# Patient Record
Sex: FEMALE | Race: WHITE
[De-identification: names, ages, dates, MRNs, and addresses within clinical notes are randomized per-mention and may not be internally consistent; named-entity substitution may affect disease eponyms.]

---

## 2019-03-07 ENCOUNTER — HOSPITAL ENCOUNTER (INPATIENT)
Dept: HOSPITAL 92 - ERS | Age: 84
LOS: 2 days | Discharge: HOME | DRG: 301 | End: 2019-03-09
Attending: FAMILY MEDICINE | Admitting: FAMILY MEDICINE
Payer: MEDICARE

## 2019-03-07 VITALS — BODY MASS INDEX: 25.9 KG/M2

## 2019-03-07 DIAGNOSIS — I82.412: Primary | ICD-10-CM

## 2019-03-07 DIAGNOSIS — M81.0: ICD-10-CM

## 2019-03-07 DIAGNOSIS — F41.9: ICD-10-CM

## 2019-03-07 DIAGNOSIS — G30.0: ICD-10-CM

## 2019-03-07 DIAGNOSIS — Z90.710: ICD-10-CM

## 2019-03-07 DIAGNOSIS — E78.5: ICD-10-CM

## 2019-03-07 DIAGNOSIS — Z98.890: ICD-10-CM

## 2019-03-07 DIAGNOSIS — I12.9: ICD-10-CM

## 2019-03-07 DIAGNOSIS — Z91.81: ICD-10-CM

## 2019-03-07 DIAGNOSIS — I69.320: ICD-10-CM

## 2019-03-07 DIAGNOSIS — E03.9: ICD-10-CM

## 2019-03-07 DIAGNOSIS — Z96.651: ICD-10-CM

## 2019-03-07 DIAGNOSIS — H54.7: ICD-10-CM

## 2019-03-07 DIAGNOSIS — N18.3: ICD-10-CM

## 2019-03-07 DIAGNOSIS — F32.9: ICD-10-CM

## 2019-03-07 DIAGNOSIS — I25.10: ICD-10-CM

## 2019-03-07 DIAGNOSIS — K21.9: ICD-10-CM

## 2019-03-07 DIAGNOSIS — F02.80: ICD-10-CM

## 2019-03-07 DIAGNOSIS — Z95.5: ICD-10-CM

## 2019-03-07 LAB
ALBUMIN SERPL BCG-MCNC: 3.8 G/DL (ref 3.4–4.8)
ALP SERPL-CCNC: 86 U/L (ref 40–150)
ALT SERPL W P-5'-P-CCNC: 24 U/L (ref 8–55)
ANION GAP SERPL CALC-SCNC: 13 MMOL/L (ref 10–20)
APTT PPP: 26.4 SEC (ref 22.9–36.1)
AST SERPL-CCNC: 23 U/L (ref 5–34)
BASOPHILS # BLD AUTO: 0 THOU/UL (ref 0–0.2)
BASOPHILS NFR BLD AUTO: 0.5 % (ref 0–1)
BILIRUB SERPL-MCNC: 0.4 MG/DL (ref 0.2–1.2)
BUN SERPL-MCNC: 33 MG/DL (ref 9.8–20.1)
CALCIUM SERPL-MCNC: 9.2 MG/DL (ref 7.8–10.44)
CHLORIDE SERPL-SCNC: 109 MMOL/L (ref 98–107)
CO2 SERPL-SCNC: 23 MMOL/L (ref 23–31)
CREAT CL PREDICTED SERPL C-G-VRATE: 0 ML/MIN (ref 70–130)
EOSINOPHIL # BLD AUTO: 0.1 THOU/UL (ref 0–0.7)
EOSINOPHIL NFR BLD AUTO: 0.7 % (ref 0–10)
GLOBULIN SER CALC-MCNC: 2.8 G/DL (ref 2.4–3.5)
GLUCOSE SERPL-MCNC: 95 MG/DL (ref 83–110)
HGB BLD-MCNC: 14.1 G/DL (ref 12–16)
INR PPP: 1
LYMPHOCYTES # BLD: 1.3 THOU/UL (ref 1.2–3.4)
LYMPHOCYTES NFR BLD AUTO: 12.5 % (ref 21–51)
MCH RBC QN AUTO: 34.7 PG (ref 27–31)
MCV RBC AUTO: 103 FL (ref 78–98)
MONOCYTES # BLD AUTO: 0.7 THOU/UL (ref 0.11–0.59)
MONOCYTES NFR BLD AUTO: 6.9 % (ref 0–10)
NEUTROPHILS # BLD AUTO: 8.4 THOU/UL (ref 1.4–6.5)
NEUTROPHILS NFR BLD AUTO: 79.5 % (ref 42–75)
PLATELET # BLD AUTO: 166 THOU/UL (ref 130–400)
POTASSIUM SERPL-SCNC: 4.6 MMOL/L (ref 3.5–5.1)
PROTHROMBIN TIME: 13.3 SEC (ref 12–14.7)
RBC # BLD AUTO: 4.07 MILL/UL (ref 4.2–5.4)
SODIUM SERPL-SCNC: 140 MMOL/L (ref 136–145)
WBC # BLD AUTO: 10.6 THOU/UL (ref 4.8–10.8)

## 2019-03-07 PROCEDURE — 85610 PROTHROMBIN TIME: CPT

## 2019-03-07 PROCEDURE — 85018 HEMOGLOBIN: CPT

## 2019-03-07 PROCEDURE — 36415 COLL VENOUS BLD VENIPUNCTURE: CPT

## 2019-03-07 PROCEDURE — 93005 ELECTROCARDIOGRAM TRACING: CPT

## 2019-03-07 PROCEDURE — 80053 COMPREHEN METABOLIC PANEL: CPT

## 2019-03-07 PROCEDURE — 85049 AUTOMATED PLATELET COUNT: CPT

## 2019-03-07 PROCEDURE — 85025 COMPLETE CBC W/AUTO DIFF WBC: CPT

## 2019-03-07 PROCEDURE — 96376 TX/PRO/DX INJ SAME DRUG ADON: CPT

## 2019-03-07 PROCEDURE — 85730 THROMBOPLASTIN TIME PARTIAL: CPT

## 2019-03-07 PROCEDURE — 80048 BASIC METABOLIC PNL TOTAL CA: CPT

## 2019-03-07 PROCEDURE — 96374 THER/PROPH/DIAG INJ IV PUSH: CPT

## 2019-03-07 PROCEDURE — 85014 HEMATOCRIT: CPT

## 2019-03-07 PROCEDURE — 82565 ASSAY OF CREATININE: CPT

## 2019-03-07 NOTE — ULT
LEFT LOWER EXTREMITY VENOUS ULTRASOUND WITH DOPPLER

3/7/19

 

HISTORY: 

Swelling. Edema. Evaluate for thrombus.

 

COMPARISON:  

None.

 

TECHNIQUE:  

Gray scale, color flow, doppler imaging with spectral waveform analysis performed in a left lower ext
remity venous system. 

 

FINDINGS:  

there is partial compressibility of the common femoral vein, femoral vein. There is lack of compressi
bility of the popliteal vein. There is diminished flow, compatible with deep vein thrombus.  There is
 also lack of compressibility in the greater saphenous vein. Posterior tibial vein is also not compre
ssible. There is flow in the profunda femoral vein.

 

IMPRESSION:  

Thrombus in the left lower extremity deep venous system. 

 

POS: EMMANUEL

## 2019-03-08 LAB
APTT PPP: (no result) SEC (ref 22.9–36.1)
APTT PPP: 190.7 SEC (ref 22.9–36.1)
CREAT CL PREDICTED SERPL C-G-VRATE: 33 ML/MIN (ref 70–130)
HGB BLD-MCNC: 12 G/DL (ref 12–16)
HGB BLD-MCNC: 13.7 G/DL (ref 12–16)
PLATELET # BLD AUTO: 153 THOU/UL (ref 130–400)
PLATELET # BLD AUTO: 170 THOU/UL (ref 130–400)

## 2019-03-08 RX ADMIN — Medication SCH ML: at 20:33

## 2019-03-08 RX ADMIN — Medication SCH ML: at 12:51

## 2019-03-08 RX ADMIN — ASPIRIN SCH MG: 81 TABLET ORAL at 09:45

## 2019-03-08 NOTE — PDOC.PN
- Subjective


Encounter Start Date: 03/08/19


Encounter Start Time: 09:30





Patient seen and examined, no new issues,  at bedside, all questions 

answered. 





- Objective


Resuscitation Status - Order Detail:





03/08/19 05:35


Resuscitation Status Routine 


   Resuscitation Status: FULL: Full Resuscitation








Vital Signs & Weight: 


 Vital Signs (12 hours)











  Temp Pulse Resp BP Pulse Ox


 


 03/08/19 04:40  97.9 F  79  12  139/69  94 L


 


 03/07/19 23:26  97.5 F L  84  19  143/67 H  94 L








 Weight











Weight                         137 lb 9.095 oz














I&O: 


 











 03/07/19 03/08/19 03/09/19





 06:59 06:59 06:59


 


Intake Total  568 


 


Balance  568 











Result Diagrams: 


 03/08/19 06:02





 03/07/19 18:20





Phys Exam





- Physical Examination


HEENT: PERRLA, moist MMs, sclera anicteric


Neck: no nodes, no JVD, supple


Respiratory: no wheezing, no rales, no rhonchi


Cardiovascular: RRR, no significant murmur, no rub


Gastrointestinal: soft, non-tender, no distention


Musculoskeletal: pulses present, edema present (LLE with brusing)





Dx/Plan


(1) DVT (deep venous thrombosis)


Code(s): I82.409 - ACUTE EMBOLISM AND THOMBOS UNSP DEEP VN UNSP LOWER EXTREMITY

   Status: Acute   





(2) Hypothyroidism


Code(s): E03.9 - HYPOTHYROIDISM, UNSPECIFIED   Status: Acute   





(3) CKD (chronic kidney disease) stage 3, GFR 30-59 ml/min


Code(s): N18.3 - CHRONIC KIDNEY DISEASE, STAGE 3 (MODERATE)   Status: Acute   





(4) Weakness


Code(s): R53.1 - WEAKNESS   Status: Chronic   





- Plan





* cont heparin drip for now


* will monitor for 24-48hrs, significant swelling


* patient's  [place] patient and  

were offered to switch to a PO anticoagulant and requested that they follow up 

with their PCP in 1 week for repeat blood work and monitoring, however, the 

patient's  states that they were told that there was a concern with the 

clot moving and so observation in the hospital for 24-48hrs would be best


*  [place] medical facilities, so will 

monitor for now and discharge once improvement noted


* case and plan d/w patient's  at length, he understood and agreed with 

this plan.

## 2019-03-09 VITALS — TEMPERATURE: 97.6 F | SYSTOLIC BLOOD PRESSURE: 174 MMHG | DIASTOLIC BLOOD PRESSURE: 79 MMHG

## 2019-03-09 LAB
ANION GAP SERPL CALC-SCNC: 10 MMOL/L (ref 10–20)
BUN SERPL-MCNC: 28 MG/DL (ref 9.8–20.1)
CALCIUM SERPL-MCNC: 8.7 MG/DL (ref 7.8–10.44)
CHLORIDE SERPL-SCNC: 111 MMOL/L (ref 98–107)
CO2 SERPL-SCNC: 23 MMOL/L (ref 23–31)
CREAT CL PREDICTED SERPL C-G-VRATE: 36 ML/MIN (ref 70–130)
GLUCOSE SERPL-MCNC: 105 MG/DL (ref 83–110)
POTASSIUM SERPL-SCNC: 4.2 MMOL/L (ref 3.5–5.1)
SODIUM SERPL-SCNC: 140 MMOL/L (ref 136–145)

## 2019-03-09 RX ADMIN — ASPIRIN SCH MG: 81 TABLET ORAL at 09:30

## 2019-03-09 RX ADMIN — Medication SCH ML: at 11:07

## 2019-03-09 NOTE — DIS
DATE OF ADMISSION:  03/08/2019



DATE OF DISCHARGE:  03/09/2019



PRIMARY CARE PHYSICIAN:  [facility].



DISCHARGE DISPOSITION:  Home.



PRIMARY DISCHARGE DIAGNOSIS:  Deep venous thrombosis.



SECONDARY DISCHARGE DIAGNOSES:  Anxiety and depression, chronic kidney disease,

gastroesophageal reflux disease, hypertension, and hypothyroidism. 



PRIMARY PROCEDURE/OPERATION:  None.



RADIOLOGICAL INVESTIGATION:  Ultrasound of lower extremity, positive for deep vein

thrombosis in the left lower extremity. 



SIGNIFICANT LABORATORY DATA:  WBC 10.6, hemoglobin 12.0, platelet 170.  INR 1.0,

sodium 140, potassium 4.2, BUN 28, creatinine 1.16, calcium 8.7.  LFT, normal.

Stool for guaiac positive. 



DISCHARGE MEDICATIONS:  

1. Norco 5 one tablet q.6 hourly p.r.n.

2. Tramadol 50 mg one tablet q.6 hourly p.r.n.

3. Amlodipine 2.5 mg p.o. daily.

4. Coreg 3.125 mg b.i.d.

5. Imdur 30 mg daily.

6. Levothyroxine 50 mcg daily.

7. MiraLAX 17 g p.o. daily.

8. Prednisone 5 mg p.o. b.i.d.

9. Effexor XR 75 mg daily.

10. Eliquis 10 mg b.i.d. for 7 days, then 5 mg b.i.d.

11. Protonix 40 mg p.o. daily.

12. Potassium chloride 20 mEq p.o. daily.



CONTRAINDICATION:  None.



CODE STATUS:  Full code.



INPATIENT CONSULTANT:  None.



ALLERGIES:  NO KNOWN DRUG ALLERGIES.



DISCHARGE PLAN:  Posthospital, the patient will make appointment with primary care

physician in 2 to 3 days. 



HOSPITAL COURSE:  An 86-year-old female, who was admitted by Dr. Christianson.  The patient

was brought to ER for left lower extremity swelling and erythema.  Ultrasound

confirmed deep vein thrombosis.  In the emergency room, the patient was initially

treated with heparin drip, subsequently the patient was started on Eliquis.  The

patient's  wanted to take her home today.  The patient is hemodynamically

stable and she will continue all her previous medication except aspirin.  Risk and

benefit of Eliquis therapy discussed with the patient's family member and they

agreed to continue Eliquis therapy upon discharge.  New medication prescription

sent.  The patient is vitals wise stable and family member insisting on discharge

today and that is why we will consider discharging her home, and they will follow up

with primary care physician early next week. 



Please see my progress note from today for further detail.







Job ID:  [id number]

## 2019-03-09 NOTE — PDOC.PN
- Subjective


Encounter Start Date: 03/09/19


Encounter Start Time: 08:20


-: old records requested/rev





Patient seen and examined. No new complaints. No overnight events





- Objective


Resuscitation Status - Order Detail:





03/08/19 05:35


Resuscitation Status Routine 


   Resuscitation Status: FULL: Full Resuscitation








MAR Reviewed: Yes


Vital Signs & Weight: 


 Vital Signs (12 hours)











  Temp Pulse Resp BP Pulse Ox


 


 03/09/19 09:30   76   


 


 03/09/19 07:30  97.6 F  76  18  174/79 H  95


 


 03/09/19 04:45  97.0 F L  86  15  166/77 H  95


 


 03/08/19 23:43  97.5 F L  86    95








 Weight











Weight                         143 lb














I&O: 


 











 03/08/19 03/09/19 03/10/19





 06:59 06:59 07:59


 


Intake Total 568 840 


 


Balance 568 840 











Result Diagrams: 


 03/08/19 22:16





 03/09/19 06:09


Radiology Reviewed by me: Yes


EKG Reviewed by me: Yes





Phys Exam





- Physical Examination


Constitutional: NAD


HEENT: PERRLA, moist MMs, sclera anicteric


Neck: no JVD, supple


Respiratory: no wheezing, no rales, no rhonchi


Cardiovascular: RRR, no significant murmur, no rub


Gastrointestinal: soft, non-tender, no distention


Musculoskeletal: no edema, pulses present


Neurological: moves all 4 limbs


Lymphatic: no nodes


Psychiatric: normal affect


Skin: no rash, normal turgor





Dx/Plan


(1) DVT (deep venous thrombosis)


Code(s): I82.409 - ACUTE EMBOLISM AND THOMBOS UNSP DEEP VN UNSP LOWER EXTREMITY

   Status: Acute   





(2) Anxiety and depression


Code(s): F41.9 - ANXIETY DISORDER, UNSPECIFIED; F32.9 - MAJOR DEPRESSIVE 

DISORDER, SINGLE EPISODE, UNSPECIFIED   Status: Chronic   





(3) CKD (chronic kidney disease) stage 3, GFR 30-59 ml/min


Code(s): N18.3 - CHRONIC KIDNEY DISEASE, STAGE 3 (MODERATE)   Status: Chronic   





(4) GERD (gastroesophageal reflux disease)


Code(s): K21.9 - GASTRO-ESOPHAGEAL REFLUX DISEASE WITHOUT ESOPHAGITIS   Status: 

Chronic   





(5) Hypertension


Code(s): I10 - ESSENTIAL (PRIMARY) HYPERTENSION   Status: Chronic   





(6) Hypothyroidism


Code(s): E03.9 - HYPOTHYROIDISM, UNSPECIFIED   Status: Chronic   





- Plan


cont current plan of care, plan discussed w/ family





* medication reviewed as below


* symptomatic treatment


* see discharge yolie.








Review of Systems





- Review of Systems


Other: 





not reliable with pt but addressed with 





- Medications/Allergies


Allergies/Adverse Reactions: 


 Allergies











Allergy/AdvReac Type Severity Reaction Status Date / Time


 


No Known Drug Allergies Allergy   Verified 10/01/16 10:57











Medications: 


 Current Medications





Acetaminophen (Tylenol)  1,000 mg PO Q6H PRN


   PRN Reason: Mild Pain (1-3)


Hydrocodone Bitart/Acetaminophen (Norco 5/325)  1 tab PO Q6H PRN


   PRN Reason: Pain


   Last Admin: 03/08/19 20:24 Dose:  1 tab


Amlodipine Besylate (Norvasc)  2.5 mg PO DAILY [facility]


   Last Admin: 03/09/19 09:30 Dose:  2.5 mg


Apixaban (Eliquis)  10 mg PO BID [facility]


Aspirin (Ecotrin)  81 mg PO DAILY [facility]


   Last Admin: 03/09/19 09:30 Dose:  81 mg


Carvedilol (Coreg)  3.125 mg PO BID [facility]


   Last Admin: 03/09/19 09:30 Dose:  3.125 mg


Hydralazine HCl (Apresoline)  10 mg SLOW IVP Q4H PRN


   PRN Reason: SBP > 180 and HR < 70


Isosorbide Mononitrate (Imdur Er)  30 mg PO DAILY [facility]


   Last Admin: 03/09/19 09:31 Dose:  30 mg


Levothyroxine Sodium (Synthroid)  50 mcg PO 0600 [facility]


   Last Admin: 03/09/19 06:39 Dose:  50 mcg


Ondansetron HCl (Zofran Odt)  4 mg PO Q6H PRN


   PRN Reason: Nausea/Vomiting


Ondansetron HCl (Zofran)  4 mg IVP Q6H PRN


   PRN Reason: Nausea/Vomiting


Pantoprazole Sodium (Protonix)  40 mg PO DAILY [facility]


Polyethylene Glycol (Miralax)  17 gm PO DAILY PRN


   PRN Reason: Constipation


Potassium Chloride (K-Dur)  20 meq PO DAILY [facility]


   Last Admin: 03/09/19 09:31 Dose:  20 meq


Prednisone (Prednisone)  5 mg PO BID [facility]


   Last Admin: 03/09/19 09:31 Dose:  5 mg


Sodium Chloride (Flush - Normal Saline)  10 ml IVF Q12HR [facility]


   Last Admin: 03/08/19 20:33 Dose:  10 ml


Sodium Chloride (Flush - Normal Saline)  10 ml IVF PRN PRN


   PRN Reason: Saline Flush


Tramadol HCl (Ultram)  50 mg PO Q6H PRN


   PRN Reason: Pain


Venlafaxine HCl (Effexor Xr)  75 mg PO DAILY [facility]

## 2019-03-09 NOTE — EKG
Test Reason : 

Blood Pressure : ***/*** mmHG

Vent. Rate : 089 BPM     Atrial Rate : 089 BPM

   P-R Int : 154 ms          QRS Dur : 086 ms

    QT Int : 374 ms       P-R-T Axes : 089 -01 073 degrees

   QTc Int : 455 ms

 

Normal sinus rhythm

Normal ECG

 

Confirmed by LOBO HERMAN ([id number]),  RAD DAVIS ([id number]) on 3/9/2019 9:34:43 PM

 

Referred By:             Confirmed By:LOBO HERMAN

## 2019-09-24 ENCOUNTER — HOSPITAL ENCOUNTER (INPATIENT)
Dept: HOSPITAL 92 - ERS | Age: 84
LOS: 2 days | Discharge: NURSING, REHAB OR LONG-TERM CARE | DRG: 100 | End: 2019-09-26
Attending: INTERNAL MEDICINE | Admitting: INTERNAL MEDICINE
Payer: MEDICARE

## 2019-09-24 VITALS — BODY MASS INDEX: 24 KG/M2

## 2019-09-24 DIAGNOSIS — I63.312: ICD-10-CM

## 2019-09-24 DIAGNOSIS — F02.80: ICD-10-CM

## 2019-09-24 DIAGNOSIS — G30.9: ICD-10-CM

## 2019-09-24 DIAGNOSIS — G40.909: Primary | ICD-10-CM

## 2019-09-24 DIAGNOSIS — E03.9: ICD-10-CM

## 2019-09-24 DIAGNOSIS — I25.10: ICD-10-CM

## 2019-09-24 DIAGNOSIS — Z79.52: ICD-10-CM

## 2019-09-24 DIAGNOSIS — I12.9: ICD-10-CM

## 2019-09-24 DIAGNOSIS — N17.9: ICD-10-CM

## 2019-09-24 DIAGNOSIS — Z79.899: ICD-10-CM

## 2019-09-24 DIAGNOSIS — Z79.01: ICD-10-CM

## 2019-09-24 DIAGNOSIS — Z74.01: ICD-10-CM

## 2019-09-24 DIAGNOSIS — N18.3: ICD-10-CM

## 2019-09-24 DIAGNOSIS — Z66: ICD-10-CM

## 2019-09-24 DIAGNOSIS — K21.9: ICD-10-CM

## 2019-09-24 LAB
ALBUMIN SERPL BCG-MCNC: 3.8 G/DL (ref 3.4–4.8)
ALP SERPL-CCNC: 66 U/L (ref 40–150)
ALT SERPL W P-5'-P-CCNC: 14 U/L (ref 8–55)
ANION GAP SERPL CALC-SCNC: 12 MMOL/L (ref 10–20)
AST SERPL-CCNC: 15 U/L (ref 5–34)
BACTERIA UR QL AUTO: (no result) HPF
BASOPHILS # BLD AUTO: 0 THOU/UL (ref 0–0.2)
BASOPHILS NFR BLD AUTO: 0.3 % (ref 0–1)
BILIRUB SERPL-MCNC: 0.3 MG/DL (ref 0.2–1.2)
BUN SERPL-MCNC: 29 MG/DL (ref 9.8–20.1)
CALCIUM SERPL-MCNC: 9.2 MG/DL (ref 7.8–10.44)
CHLORIDE SERPL-SCNC: 106 MMOL/L (ref 98–107)
CK SERPL-CCNC: 248 U/L (ref 29–168)
CO2 SERPL-SCNC: 28 MMOL/L (ref 23–31)
CREAT CL PREDICTED SERPL C-G-VRATE: 0 ML/MIN (ref 70–130)
EOSINOPHIL # BLD AUTO: 0.1 THOU/UL (ref 0–0.7)
EOSINOPHIL NFR BLD AUTO: 1.1 % (ref 0–10)
GLOBULIN SER CALC-MCNC: 2.7 G/DL (ref 2.4–3.5)
GLUCOSE SERPL-MCNC: 93 MG/DL (ref 83–110)
HGB BLD-MCNC: 13 G/DL (ref 12–16)
LIPASE SERPL-CCNC: 26 U/L (ref 8–78)
LYMPHOCYTES # BLD: 1.7 THOU/UL (ref 1.2–3.4)
LYMPHOCYTES NFR BLD AUTO: 19.2 % (ref 21–51)
MAGNESIUM SERPL-MCNC: 2 MG/DL (ref 1.6–2.6)
MCH RBC QN AUTO: 33.8 PG (ref 27–31)
MCV RBC AUTO: 102 FL (ref 78–98)
MONOCYTES # BLD AUTO: 0.7 THOU/UL (ref 0.11–0.59)
MONOCYTES NFR BLD AUTO: 7.9 % (ref 0–10)
NEUTROPHILS # BLD AUTO: 6.2 THOU/UL (ref 1.4–6.5)
NEUTROPHILS NFR BLD AUTO: 71.5 % (ref 42–75)
PLATELET # BLD AUTO: 249 THOU/UL (ref 130–400)
POTASSIUM SERPL-SCNC: 3.9 MMOL/L (ref 3.5–5.1)
RBC # BLD AUTO: 3.84 MILL/UL (ref 4.2–5.4)
RBC UR QL AUTO: (no result) HPF (ref 0–3)
SODIUM SERPL-SCNC: 142 MMOL/L (ref 136–145)
WBC # BLD AUTO: 8.6 THOU/UL (ref 4.8–10.8)
WBC UR QL AUTO: (no result) HPF (ref 0–3)
YEAST # UR AUTO: (no result) HPF

## 2019-09-24 PROCEDURE — 90670 PCV13 VACCINE IM: CPT

## 2019-09-24 PROCEDURE — G0009 ADMIN PNEUMOCOCCAL VACCINE: HCPCS

## 2019-09-24 PROCEDURE — 81015 MICROSCOPIC EXAM OF URINE: CPT

## 2019-09-24 PROCEDURE — 80053 COMPREHEN METABOLIC PANEL: CPT

## 2019-09-24 PROCEDURE — 70450 CT HEAD/BRAIN W/O DYE: CPT

## 2019-09-24 PROCEDURE — 71045 X-RAY EXAM CHEST 1 VIEW: CPT

## 2019-09-24 PROCEDURE — 87086 URINE CULTURE/COLONY COUNT: CPT

## 2019-09-24 PROCEDURE — 82550 ASSAY OF CK (CPK): CPT

## 2019-09-24 PROCEDURE — 96365 THER/PROPH/DIAG IV INF INIT: CPT

## 2019-09-24 PROCEDURE — 83605 ASSAY OF LACTIC ACID: CPT

## 2019-09-24 PROCEDURE — 74018 RADEX ABDOMEN 1 VIEW: CPT

## 2019-09-24 PROCEDURE — 83690 ASSAY OF LIPASE: CPT

## 2019-09-24 PROCEDURE — 83735 ASSAY OF MAGNESIUM: CPT

## 2019-09-24 PROCEDURE — 70551 MRI BRAIN STEM W/O DYE: CPT

## 2019-09-24 PROCEDURE — 90471 IMMUNIZATION ADMIN: CPT

## 2019-09-24 PROCEDURE — 85025 COMPLETE CBC W/AUTO DIFF WBC: CPT

## 2019-09-24 PROCEDURE — 96361 HYDRATE IV INFUSION ADD-ON: CPT

## 2019-09-24 PROCEDURE — 51701 INSERT BLADDER CATHETER: CPT

## 2019-09-24 PROCEDURE — 80061 LIPID PANEL: CPT

## 2019-09-24 PROCEDURE — 93005 ELECTROCARDIOGRAM TRACING: CPT

## 2019-09-24 PROCEDURE — 84146 ASSAY OF PROLACTIN: CPT

## 2019-09-24 PROCEDURE — 80048 BASIC METABOLIC PNL TOTAL CA: CPT

## 2019-09-24 PROCEDURE — 36415 COLL VENOUS BLD VENIPUNCTURE: CPT

## 2019-09-24 PROCEDURE — 81003 URINALYSIS AUTO W/O SCOPE: CPT

## 2019-09-24 NOTE — RAD
EXAM: Single view of the abdomen



HISTORY: Evaluate for neuro stimulator



COMPARISON: None



FINDINGS: Single view of the abdomen shows a nonspecific, nonobstructive bowel gas pattern.  No suspi
cious calcifications are seen.   Degenerative changes are seen in the spine. There appears to be a

small amount of retained sacral stimulator device along the right aspect of the sacrum without residu
al wires seen.



IMPRESSION: Small amount of retained sacral stimulator



Reported By: Franco Santana 

Electronically Signed:  9/24/2019 3:51 PM

## 2019-09-24 NOTE — CT
BRAIN CT WITHOUT IV CONTRAST:

 

Date:  09/24/19 

 

HISTORY:  

Altered mental status. Confusion. Unwilling to speak or walk. 

 

COMPARISON:  

04/25/16. 

 

FINDINGS:

Marked atrophy and chronic white matter ischemic changes. Encephalomalacia changes in the posterior l
eft temporal lobe and parietal and occipital lobes, evidence for old left middle cerebral artery dist
ribution infarct changes with some associated brain volume loss and some dilatation of the left later
al ventricle trigone and temporal horn region. No focal mass or midline shift. No intra or extra-axia
l hemorrhage. 

 

IMPRESSION: 

Marked atrophy and chronic white matter ischemic changes, progressive from prior study. Interval old 
left middle cerebral artery distribution infarct with some associated brain volume loss. No mass or b
leed. 

 

 

POS: OFF

## 2019-09-24 NOTE — PDOC.HHP
Hospitalist HPI





- History of Present Illness


AMS


History of Present Illness: 


Patient initially assessed earlier today at 13:00.


She was uncommunicative and responsive to voice as well as painful stimuli.


Unable to follow commands. Per RN, son reported this was her normal baseline. 


She was brought in due to concern for seizure, which may have been witnessed.


Appears to have bit her tongue, found with dry blood around her mouth.  


History of previous strokes and dementia. 


She is nonverbal and immobile at baseline. 


ED Course: 


In the ED she had a brain CT done. Notable for marked atrophy and chronic white 

matter ischemic changes progressive from


prior study.  Interval old left MCA infarct with some associated brain volume 

loss.  No mass or bleed.  





Patient started on Keppra and give IV fluids. 





Hospitalist ROS





- Review of Systems


ROS unobtainable: due to mental status





- Medication


Medications: 


Active Medications











Generic Name Dose Route Start Last Admin





  Trade Name Freq  PRN Reason Stop Dose Admin


 


Acetaminophen  650 mg  09/24/19 14:15  09/24/19 20:36





  Tylenol  PO   650 mg





  Q4H PRN   Administration





  Headache/Fever/Mild Pain (1-3)   





     





     





     


 


Apixaban  5 mg  09/24/19 21:00  09/24/19 20:22





  Eliquis  PO   5 mg





  BID [facility]   Administration





     





     





     





     


 


Carvedilol  3.125 mg  09/24/19 21:00  09/24/19 20:22





  Coreg  PO   3.125 mg





  BID [facility]   Administration





     





     





     





     


 


Hydralazine HCl  10 mg  09/24/19 18:21  09/24/19 19:13





  Apresoline  SLOW IVP   10 mg





  Q4H PRN   Administration





  SBP Greater Than 180   





     





     





     


 


Levetiracetam 500 mg/ Device  100 mls @ 200 mls/hr  09/24/19 21:00  09/24/19 20:

20





  IVPB  09/24/19 23:00  100 mls





  BID [facility]   Administration





     





     





     





     


 


Sodium Chloride  1,000 mls @ 65 mls/hr  09/24/19 14:15  09/24/19 14:55





  Normal Saline 0.9%  IV   1,000 mls





  .[id number] [facility]   Administration





     





     





     





     














Hospitalist History





- Past Medical History


Source: RN notes reviewed, nursing home record, old records


Cardiac: reports: CAD (Previous setent placed)


CNS: reports: CVA, Dementia (Alzheimer's), TIA





- Past Surgical History


Past Surgical History: reports: Hysterectomy, Other (Back surgery, colostomy, 

knee surgery, spine surgery)





- Family History


Family History: reports: no pertinent history





- Social History


Smoking Status: Never smoker


Alcohol: reports: None


Drugs: reports: none


Living Situation: Nursing Home


Activity level: bed bound





- Exam


General - other findings: Patient lethargic, not communicative, not following 

commands


Eye: PERRL, anicteric sclera


ENT: normocephalic atraumatic, dry oral mucosa (Dried blood on lips. No visble 

wounds, difficulty assess for wounds on tongue)


Neck: supple, no lymphadenopathy


Heart: RRR, no murmur, normal peripheral pulses


Respiratory: CTAB, no wheezes, no rales


Gastrointestinal: soft, non-distended, no palpable masses


Extremities: no edema


Skin: no lesions, no rashes


Neurological - other findings: Unable to assess, patient not following commands

, possible deficits hx CVA


Musculoskeletal - other findings: left extremity weakness/decreased  tone 

compared to right


Psychiatric: lethargic





Hospitalist Results





- Labs


Result Diagrams: 


 09/24/19 09:03





 09/24/19 09:03


Lab results: 


 











WBC  8.6 thou/uL (4.8-10.8)   09/24/19  09:03    


 


Hgb  13.0 g/dL (12.0-16.0)   09/24/19  09:03    


 


Hct  39.0 % (36.0-47.0)   09/24/19  09:03    


 


MCV  102.0 fL (78.0-98.0)  H  09/24/19  09:03    


 


Plt Count  249 thou/uL (130-400)   09/24/19  09:03    


 


Neutrophils %  71.5 % (42.0-75.0)   09/24/19  09:03    


 


Sodium  142 mmol/L (136-145)   09/24/19  09:03    


 


Potassium  3.9 mmol/L (3.5-5.1)   09/24/19  09:03    


 


Chloride  106 mmol/L ()   09/24/19  09:03    


 


Carbon Dioxide  28 mmol/L (23-31)   09/24/19  09:03    


 


BUN  29 mg/dL (9.8-20.1)  H  09/24/19  09:03    


 


Creatinine  1.47 mg/dL (0.6-1.1)  H  09/24/19  09:03    


 


Glucose  93 mg/dL ()   09/24/19  09:03    


 


Lactic Acid  1.1 mmol/L (0.5-2.2)   09/24/19  14:58    


 


Calcium  9.2 mg/dL (7.8-10.44)   09/24/19  09:03    


 


Total Bilirubin  0.3 mg/dL (0.2-1.2)   09/24/19  09:03    


 


AST  15 U/L (5-34)   09/24/19  09:03    


 


ALT  14 U/L (8-55)   09/24/19  09:03    


 


Alkaline Phosphatase  66 U/L ()   09/24/19  09:03    


 


Creatine Kinase  248 U/L ()  H  09/24/19  14:58    


 


Serum Total Protein  6.5 g/dL (6.0-8.3)   09/24/19  09:03    


 


Albumin  3.8 g/dL (3.4-4.8)   09/24/19  09:03    


 


Lipase  26 U/L (8-78)   09/24/19  14:58    


 


Urine Ketones  Negative mg/dL (Negative)   09/24/19  09:00    


 


Urine Blood  Moderate  (Negative)  A  09/24/19  09:00    


 


Urine Nitrite  Negative  (Negative)   09/24/19  09:00    


 


Ur Leukocyte Esterase  Negative  (Negative)   09/24/19  09:00    


 


Urine RBC  0-3 HPF (0-3)   09/24/19  09:00    


 


Urine WBC  0-3 HPF (0-3)   09/24/19  09:00    


 


Ur Squamous Epith Cells  0-3 HPF (0-3)   09/24/19  09:00    


 


Urine Bacteria  2+ HPF (None Seen)  A  09/24/19  09:00    














- Radiology Interpretation


  ** CT scan - head


Status: report reviewed by me





Hospitalist H&P A/P





- Problem


(1) Seizure


Code(s): R56.9 - UNSPECIFIED CONVULSIONS   Status: Acute   





(2) Altered mental status


Code(s): R41.82 - ALTERED MENTAL STATUS, UNSPECIFIED   Status: Acute   





(3) CKD (chronic kidney disease) stage 3, GFR 30-59 ml/min


Code(s): N18.3 - CHRONIC KIDNEY DISEASE, STAGE 3 (MODERATE)   Status: Chronic   





(4) Hypertension


Code(s): I10 - ESSENTIAL (PRIMARY) HYPERTENSION   Status: Chronic   





(5) Hypothyroidism


Code(s): E03.9 - HYPOTHYROIDISM, UNSPECIFIED   Status: Chronic   





- Plan


Plan: 


Lipase, CK, Mg, and thyroid function added on.


MRI brain ordered as well as Neuro consult.  


Continue Keppra as per ED physician (Prolactin elevated).


Ativan prn.  Seizure precautions.


UA/UCx.  


CXR to assess for underlying infection/aspiration.


Reportedly eats but given current state keep NPO.


Speech therapy to assess swallowing. 


Gentle hydration.  


Monitor BP, resume home meds once verified. 


Palliative care would be beneficial to discuss goals of care.


Patient is DNAR, her surrogate decision maker is her son: Pietro Webb.

## 2019-09-24 NOTE — MRI
Exam: Brain MRI without contrast



HISTORY: New onset seizure



COMPARISON: 4/25/2016



Correlation: Head CT 9/24/2019



FINDINGS:

Examination is limited due to extensive motion degradation on multiple sequences

Calvarial marrow signal intensity: Appropriate T1 signal

Gradient echo sequence: No hemorrhage

Brain parenchyma: With regards to the right cerebrum, cortical gray-white matter differentiation is p
reserved. There is no sulcal effacement. With regards the left cerebrum, there is laminar necrosis,

malacic and gliotic change. There is a small focus of restricted diffusion involving the left tempora
l lobe, surrounded by the aforementioned areas of gliosis, malacia and laminar necrosis.

White matter signal intensities:Confluent T2, FLAIR white matter hyperintensities due to chronic smal
l vessel ischemic changes.

Central arterial flow voids are maintained.





Sinuses: Adequate aeration of the paranasal sinuses and mastoid air cells. 





IMPRESSION:



1. Confluent T2 and FLAIR white matter hyperintensities due to chronic small vessel change

2. Malacia, gliosis and laminar process involving the left temporal and parietal lobe compatible with
 remote left MCA distribution infarct. Within this area of remote insult, there is a focal area of

restricted diffusion suggesting acute infarct.



Reported By: Sloane Martinez 

Electronically Signed:  9/24/2019 5:56 PM

## 2019-09-24 NOTE — RAD
Chest one view



HISTORY: Chest pain. Aspiration.



COMPARISON: 4/25/2016.



FINDINGS: Cardiac silhouette is magnified by projection. Pulmonary vasculature is unremarkable. Scarr
ing at the medial aspect of the left apex is stable. Mediastinum is midline. Calcified granulomata

and mediastinal lymph nodes are consistent with healed granulomatous disease. No confluent airspace c
onsolidation or evidence of pneumothorax. Prominent elevation of each humeral head.











IMPRESSION: No active cardiopulmonary abnormalities are demonstrated.



Bilateral chronic rotator cuff tears.



Reported By: BEST Sanabria 

Electronically Signed:  9/24/2019 2:32 PM

## 2019-09-24 NOTE — HP
PRIMARY CARE PROVIDER:  Not available.



HISTORY OF PRESENT ILLNESS:  The patient was referred to the Hospitalist Service by

[facility] Emergency Department after being brought to our emergency room with

seizure activity.  She has no known history of seizure activity.  The patient is

currently awake.  She is demented with Alzheimer disease.  She does not respond

appropriately to any question including to what her name is. 



REVIEW OF SYSTEMS:  Unobtainable from her due to her mental status.



PAST MEDICAL HISTORY:  Includes coronary artery disease, TIA, stroke in the past,

and Alzheimer dementia. 



PHYSICAL EXAMINATION:

VITAL SIGNS:  Blood pressure 176/97, pulse 86, respirations 16, O2 saturation 97% on

room air. 

NEUROLOGIC:  Cranial nerves 2 through 12 are intact.  Moves all extremities.  Deep

tendon reflexes are symmetric. 

CHEST:  Clear to auscultation and percussion. 

HEART:  Had a regular rate and rhythm.  First and second heart sounds are clear. 

ABDOMEN:  Benign. 

EXTREMITIES:  No cyanosis, clubbing, or edema.



IMAGING DATA:  Chest x-ray; no cardiomegaly, CHF, or infiltrate. 



CT scan reviewed by myself; marked atrophy, white matter ischemic changes, old left

middle cerebral artery infarct. 



LABORATORY DATA:  CBC; white count 8.6, hemoglobin 13.0, platelet count 249,000.

Comprehensive metabolic profile shows a creatinine of 1.47, BUN 29.  Prolactin is

34.64. 



ADMITTING DIAGNOSES:  Acute seizure disorder; coronary artery disease; acute renal

failure versus chronic kidney disease, stage 3; Alzheimer dementia. 



The patient has been loaded with antiseizure drugs.  An MRI has been ordered.

Neurology has been consulted.  I reviewed Akiko Rodriguez's management of the case and

concurred with it. 







Job ID:  [id number]

## 2019-09-24 NOTE — CON
DATE OF CONSULTATION:  09/24/2019



CONSULTING PHYSICIAN:  Hospitalist Service.



IMPRESSION:  

1. Seizure likely due to her underlying prior stroke.

2. Bedfast state secondary to vascular dementia and stroke.

3. Coronary artery disease.



PLAN:  

1. Continue Keppra 500 mg twice a day.

2. The patient can be discharged at your discretion.



HISTORY OF PRESENT ILLNESS:  Ms. Webb is an 86-year-old woman who has been

debilitated for some time now.  She was brought in from home after they found

evidence of bleeding from her mouth.  It was assumed that she had probably had a

seizure.  She had a CT scan of the brain done, which showed extensive small-vessel

disease as well as a left MCA stroke.  She has not had any further seizures.  Her

family reports she is back to her baseline, which is minimally responsive. 



PAST MEDICAL HISTORY:  As listed above.



ALLERGIES:  AS PER CHART.



SOCIAL HISTORY:  No tobacco or alcohol use.



FAMILY HISTORY:  Not obtainable.



REVIEW OF SYSTEMS:  Not obtainable due to her level of responsiveness.



PHYSICAL EXAMINATION:

GENERAL:  She is a well-nourished elderly lady who is lying in bed quietly. 

HEENT:  Pupils are equal.  Conjunctivae are clear.  Cranium, normocephalic and

atraumatic. 

NECK:  No lymphadenopathy. 

EXTREMITIES:  No cyanosis or edema. 

NEUROLOGIC:  She kept her eyes closed.  She would not answer any questions.  She

readjusted covers after I tried to cover her up.  She seemed to move both sides

equally well.  No abnormal movements were seen. 



SUMMARY:  This is an elderly lady who has been bedfast due to her vascular disease.

She may have had a seizure.  Keppra has been started.  I agree with your care. 







Job ID:  [id number]

## 2019-09-25 LAB
ANION GAP SERPL CALC-SCNC: 15 MMOL/L (ref 10–20)
BASOPHILS # BLD AUTO: 0 THOU/UL (ref 0–0.2)
BASOPHILS NFR BLD AUTO: 0.4 % (ref 0–1)
BUN SERPL-MCNC: 21 MG/DL (ref 9.8–20.1)
CALCIUM SERPL-MCNC: 8.3 MG/DL (ref 7.8–10.44)
CHLORIDE SERPL-SCNC: 110 MMOL/L (ref 98–107)
CO2 SERPL-SCNC: 18 MMOL/L (ref 23–31)
CREAT CL PREDICTED SERPL C-G-VRATE: 32 ML/MIN (ref 70–130)
EOSINOPHIL # BLD AUTO: 0.1 THOU/UL (ref 0–0.7)
EOSINOPHIL NFR BLD AUTO: 1.6 % (ref 0–10)
GLUCOSE SERPL-MCNC: 85 MG/DL (ref 83–110)
HGB BLD-MCNC: 12.8 G/DL (ref 12–16)
LYMPHOCYTES # BLD: 1.7 THOU/UL (ref 1.2–3.4)
LYMPHOCYTES NFR BLD AUTO: 21.5 % (ref 21–51)
MCH RBC QN AUTO: 32.6 PG (ref 27–31)
MCV RBC AUTO: 99.7 FL (ref 78–98)
MONOCYTES # BLD AUTO: 0.9 THOU/UL (ref 0.11–0.59)
MONOCYTES NFR BLD AUTO: 10.9 % (ref 0–10)
NEUTROPHILS # BLD AUTO: 5.2 THOU/UL (ref 1.4–6.5)
NEUTROPHILS NFR BLD AUTO: 65.6 % (ref 42–75)
PLATELET # BLD AUTO: 198 THOU/UL (ref 130–400)
POTASSIUM SERPL-SCNC: 4.3 MMOL/L (ref 3.5–5.1)
RBC # BLD AUTO: 3.93 MILL/UL (ref 4.2–5.4)
SODIUM SERPL-SCNC: 139 MMOL/L (ref 136–145)
WBC # BLD AUTO: 8 THOU/UL (ref 4.8–10.8)

## 2019-09-25 RX ADMIN — ISOSORBIDE MONONITRATE SCH MG: 30 TABLET, EXTENDED RELEASE ORAL at 08:36

## 2019-09-25 NOTE — PDOC.HOSPP
- Subjective


Encounter Date: 09/25/19


Encounter Time: 09:23


Subjective: 





chronically confused





- Objective


Vital Signs & Weight: 


 Vital Signs (12 hours)











  Temp Pulse Resp BP BP Pulse Ox


 


 09/25/19 08:37   91   137/75  


 


 09/25/19 08:05  98.3 F  91  16   137/75  97


 


 09/25/19 04:00  98.9 F  91  16   139/77  92 L


 


 09/25/19 00:00  98.7 F  96  18   140/93 H  93 L








 Weight











Weight                         131 lb 4.8 oz














I&O: 


 











 09/24/19 09/25/19 09/26/19





 06:59 06:59 06:59


 


Intake Total  160 


 


Balance  160 











Result Diagrams: 


 09/25/19 05:56





 09/25/19 05:56





Hospitalist ROS





- Medication


Medications: 


Active Medications











Generic Name Dose Route Start Last Admin





  Trade Name Freq  PRN Reason Stop Dose Admin


 


Acetaminophen  650 mg  09/24/19 14:15  09/24/19 20:36





  Tylenol  PO   650 mg





  Q4H PRN   Administration





  Headache/Fever/Mild Pain (1-3)   





     





     





     


 


Amlodipine Besylate  2.5 mg  09/25/19 09:00  09/25/19 08:37





  Norvasc  PO   2.5 mg





  DAILY WILLIAM   Administration





     





     





     





     


 


Carvedilol  3.125 mg  09/24/19 21:00  09/25/19 08:38





  Coreg  PO   3.125 mg





  BID WILLIAM   Administration





     





     





     





     


 


Hydralazine HCl  10 mg  09/24/19 18:21  09/24/19 19:13





  Apresoline  SLOW IVP   10 mg





  Q4H PRN   Administration





  SBP Greater Than 180   





     





     





     


 


Levetiracetam 500 mg/ Device  100 mls @ 200 mls/hr  09/24/19 21:00  09/25/19 08:

29





  IVPB   100 mls





  BID WILLIAM   Administration





     





     





     





     


 


Sodium Chloride  1,000 mls @ 65 mls/hr  09/24/19 14:15  09/25/19 08:28





  Normal Saline 0.9%  IV   1,000 mls





  [id number] WILLIAM   Administration





     





     





     





     


 


Isosorbide Mononitrate  30 mg  09/25/19 09:00  09/25/19 08:36





  Imdur Er  PO   30 mg





  DAILY WILLIAM   Administration





     





     





     





     


 


Levothyroxine Sodium  50 mcg  09/25/19 06:00  09/25/19 06:40





  Synthroid  PO   50 mcg





  0600 WILLIAM   Administration





     





     





     





     


 


Pantoprazole Sodium  40 mg  09/25/19 09:00  09/25/19 08:36





  Protonix  PO   40 mg





  DAILY WILLIAM   Administration





     





     





     





     


 


Prednisone  5 mg  09/25/19 09:00  09/25/19 08:36





  Prednisone  PO   5 mg





  DAILY WILLIAM   Administration





     





     





     





     


 


Venlafaxine HCl  75 mg  09/25/19 09:00  09/25/19 08:38





  Effexor Xr  PO   75 mg





  DAILY WILLIAM   Administration





     





     





     





     














- Exam


Neck: no JVD


Heart: RRR


Respiratory: CTAB


Gastrointestinal: soft, normal bowel sounds


Extremities: no edema


Neurological: no focal deficits





Hosp A/P


(1) CVA (cerebral vascular accident)


Code(s): I63.9 - CEREBRAL INFARCTION, UNSPECIFIED   Status: Acute   


Qualifiers: 


   CVA mechanism: thrombosis   Precerebral and cerebral artery: middle cerebral 

artery   Laterality of affected vessel: left   Qualified Code(s): I63.312 - 

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   





(2) Seizure


Code(s): R56.9 - UNSPECIFIED CONVULSIONS   Status: Acute   





(3) Alzheimer's dementia


Code(s): G30.9 - ALZHEIMER'S DISEASE, UNSPECIFIED; F02.80 - DEMENTIA IN OTH 

DISEASES CLASSD ELSWHR W/O BEHAVRL DISTURB   Status: Acute   





(4) Anticoagulant long-term use


Code(s): Z79.01 - LONG TERM (CURRENT) USE OF ANTICOAGULANTS   Status: Acute   





(5) CKD (chronic kidney disease) stage 3, GFR 30-59 ml/min


Code(s): N18.3 - CHRONIC KIDNEY DISEASE, STAGE 3 (MODERATE)   Status: Chronic   





(6) Hypertension


Code(s): I10 - ESSENTIAL (PRIMARY) HYPERTENSION   Status: Chronic   


Qualifiers: 


   Hypertension type: essential hypertension   Qualified Code(s): I10 - 

Essential (primary) hypertension   





(7) Hypothyroidism


Code(s): E03.9 - HYPOTHYROIDISM, UNSPECIFIED   Status: Chronic   


Qualifiers: 


   Hypothyroidism type: unspecified   Qualified Code(s): E03.9 - Hypothyroidism

, unspecified   





- Plan





6 mos eliquis for 1 episode DVT- DC


asa, statin for cva


keppra for sizure disorder


placement?

## 2019-09-26 VITALS — TEMPERATURE: 98.1 F

## 2019-09-26 VITALS — SYSTOLIC BLOOD PRESSURE: 165 MMHG | DIASTOLIC BLOOD PRESSURE: 89 MMHG

## 2019-09-26 LAB
CHD RISK SERPL-RTO: 4.8 (ref ?–4.5)
CHOLEST SERPL-MCNC: 351 MG/DL
HDLC SERPL-MCNC: 73 MG/DL
LDLC SERPL CALC-MCNC: 254 MG/DL
TRIGL SERPL-MCNC: 121 MG/DL (ref ?–150)

## 2019-09-26 RX ADMIN — ISOSORBIDE MONONITRATE SCH MG: 30 TABLET, EXTENDED RELEASE ORAL at 10:02

## 2019-09-26 NOTE — DIS
DATE OF ADMISSION:  09/24/2019



DATE OF DISCHARGE:  09/26/2019



DISCHARGE DISPOSITION:  Discharged to home.



PRIMARY CARE PROVIDER:  Dr. Paras Cifuentes.



FINAL DIAGNOSES:  

1. New-onset seizure disorder.

2. Cerebrovascular accident.

3. Coronary artery disease.

4. Hypertension.

5. Chronic kidney disease stage 3.

6. Gastroesophageal reflux disease.

7. Hypothyroidism.

8. Alzheimer's dementia.



DISCHARGE MEDICATIONS NEW:  

1. Aspirin 325 mg a day.

2. Lipitor 10 mg a day.

3. Keppra 500 mg twice a day.



MEDICINES DISCONTINUED:  Eliquis.



MEDICINES CONTINUED:  

1. Effexor 225 mg a day.

2. Prednisone 5 mg a day.

3. Tramadol 50 mg p.o. daily.

4. Hydrocodone 5/325 p.r.n.

5. Amlodipine 2.5 mg a day.

6. Coreg 3.125 mg a day.

7. Protonix 40 mg a day.

8. Levothyroxine 50 mcg a day.

9. K-Dur 10 mEq a day.



ALLERGIES:  NO KNOWN DRUG ALLERGIES.



CODE STATUS:  DNR.



PENDING AT THE TIME OF DISCHARGE:  Nothing.



DIET:  Currently pureed, thickened liquids.



HOSPITAL COURSE:  The patient was referred to the Hospitalist Service from [facility] for new onset seizure disorder.  The patient was loaded

with antiseizure drugs in the emergency room.  She was continued on Keppra 500 mg

twice a day.  Neurology consult was obtained and agreed with current therapy.  The

patient was put on aspirin and a statin for her stroke and Keppra for her seizure.

She has had no further seizure activity in the hospital.  The  is quite

desirous of moving her back to her assisted living.  The patient has severe dementia

and is not a candidate for PT/OT.  She did have on MRI a small infarct. 



LABORATORY DATA:  Her comprehensive metabolic profile showed a creatinine of 1.47

and BUN of 29.  Creatinine followup 1.20 and BUN 21.  Her lytes and liver function

tests were unremarkable.  Her cholesterol was elevated at 351, , HDL 73.  CBC

was really unremarkable. 



DISCHARGE PLAN:  She is being discharged for followup with PCP in 1 week.  She is in

a high intensity assisted living care which has one-on-one round-the-clock care. 







Job ID:  [id number]

## 2019-09-27 NOTE — PQF
[employer] Win[employer] Viewer

SUKUMAR STONE, [place] C  
MD

[id number]                                                             [address] [place] [address]

[id number]                             

                                   

CLINICAL DOCUMENTATION CLARIFICATION FORM:  POST DISCHARGE



Addendum to original discharge summary date:  __________________________________
____



Late entry note date:  _________________________________________________________
__











DATE:  9/27/19                                       ATTN: Dr. Sun, [place]



Please exercise your independent, professional judgment in responding to the 
clarification form. 

Clinical indicators are provided on the bottom of this form for your review



Can you please further specify the etiology of Seizure based on the clinical 
indicators below?



Please check appropriate box(s):

[  ] Seizure due to CVA

[  ] Seizure due to  to her underlying prior stroke

[  ] Seizure due to Alzheimer dementia

[  ] Seizure of unknown etiology

[  ] Other diagnosis please specify___________

[  ] Unable to determine

In addition, please specify:

Present on Admission (POA):  [  ] Yes             [  ] No             [  ] 
Unable to determine



For continuity of documentation, please document condition throughout progress 
notes and discharge summary.  Thank You.



CLINICAL INDICATORS - SIGNS / SYMPTOMS / LABS

H and P 9/24 pg.1- Brought to to our  emergency room  with seizure activity. 
She is no  known  history of seizure activity

H and P pg.1- CT scan reviewed by my self ; marked atrophy, white matter  
ischemic changes, old left middle cerebral artery infarct.

Consult 9/24 Dr. Parekh- Seizure likely due to her underlying prior stroke

Hospitalist H and P pg.1- Interval old left MCA infarct with some associated 
brain volume loss 

DS pg.1- New onset seizure  disorder

DS pg.1- Cardiovascular accident

DS pg.2- She did  have on MRI a small Infarct





RISK FACTORS

CAD- H and P pg.1

TIA, stroke in the past- h and P pg.1

Alzheimer dementia- H and P pg.1

Bedfast state- Consult 9/24 pg.1





TREATMENTS:

Gentle hydration- Hospitalist H and P pg.5

Ativan PRN- Hospitalist H and P pg.5

Seizure precaution-Hospitalist H and P pg.5

CT Brain- 9/24

[facility] MRI 9/24











(This form is maintained as a part of the permanent medical record)

2015 [employer].  All Rights Reserved

[email or web address]    [not provided]

                                                              



 

MTDD

## 2019-10-04 NOTE — EKG
Test Reason : AMS

Blood Pressure : ***/*** mmHG

Vent. Rate : 084 BPM     Atrial Rate : 084 BPM

   P-R Int : 174 ms          QRS Dur : 078 ms

    QT Int : 362 ms       P-R-T Axes : 075 -06 046 degrees

   QTc Int : 427 ms

 

Sinus rhythm with occasional Premature ventricular complexes

Otherwise normal ECG

 

Confirmed by EVAN TREVIZO, AGUSTINA (12),  RAD DAVIS ([id number]) on 10/4/2019 3:21:57 PM

 

Referred By:             Confirmed By:AGUSTINA GORDON MD